# Patient Record
Sex: MALE | Race: ASIAN | NOT HISPANIC OR LATINO | Employment: STUDENT | URBAN - METROPOLITAN AREA
[De-identification: names, ages, dates, MRNs, and addresses within clinical notes are randomized per-mention and may not be internally consistent; named-entity substitution may affect disease eponyms.]

---

## 2023-10-02 ENCOUNTER — TRANSFERRED RECORDS (OUTPATIENT)
Dept: HEALTH INFORMATION MANAGEMENT | Facility: CLINIC | Age: 23
End: 2023-10-02
Payer: COMMERCIAL

## 2023-10-23 ENCOUNTER — MEDICAL CORRESPONDENCE (OUTPATIENT)
Dept: HEALTH INFORMATION MANAGEMENT | Facility: CLINIC | Age: 23
End: 2023-10-23

## 2023-10-24 ENCOUNTER — TRANSCRIBE ORDERS (OUTPATIENT)
Dept: OTHER | Age: 23
End: 2023-10-24

## 2023-10-24 DIAGNOSIS — M67.40 GANGLION CYST: Primary | ICD-10-CM

## 2023-11-02 NOTE — TELEPHONE ENCOUNTER
Action 11/02/23  9:44 AM - [facility]   Action Taken  Called [facility] to double check that there were no images taken-- there were not.       DIAGNOSIS: Ganglion cyst-right hand/ Darline Maria CNP affiliated with [facility] / [facility]/ ortho con    APPOINTMENT DATE: 11/13/23   NOTES STATUS DETAILS   OFFICE NOTE from referring provider Care Everywhere [place]:  - 10/24/23 - Darline Maria APRN CNP

## 2023-11-03 DIAGNOSIS — M25.841 CYST OF JOINT OF HAND, RIGHT: Primary | ICD-10-CM

## 2023-11-13 ENCOUNTER — ANCILLARY PROCEDURE (OUTPATIENT)
Dept: GENERAL RADIOLOGY | Facility: CLINIC | Age: 23
End: 2023-11-13
Attending: STUDENT IN AN ORGANIZED HEALTH CARE EDUCATION/TRAINING PROGRAM
Payer: COMMERCIAL

## 2023-11-13 ENCOUNTER — PRE VISIT (OUTPATIENT)
Dept: ORTHOPEDICS | Facility: CLINIC | Age: 23
End: 2023-11-13

## 2023-11-13 ENCOUNTER — OFFICE VISIT (OUTPATIENT)
Dept: ORTHOPEDICS | Facility: CLINIC | Age: 23
End: 2023-11-13
Payer: COMMERCIAL

## 2023-11-13 DIAGNOSIS — M67.40 GANGLION CYST: ICD-10-CM

## 2023-11-13 DIAGNOSIS — M25.841 CYST OF JOINT OF HAND, RIGHT: ICD-10-CM

## 2023-11-13 PROCEDURE — 73130 X-RAY EXAM OF HAND: CPT | Mod: RT | Performed by: RADIOLOGY

## 2023-11-13 PROCEDURE — 99203 OFFICE O/P NEW LOW 30 MIN: CPT | Performed by: STUDENT IN AN ORGANIZED HEALTH CARE EDUCATION/TRAINING PROGRAM

## 2023-11-13 NOTE — LETTER
11/13/2023         RE: Joshua Steele  [address]   [address]  [place] [address]        Dear Colleague,    Thank you for referring your patient, Joshua Steele, to the [facility] [place]. Please see a copy of my visit note below.    Orthopaedic Surgery Hand and Upper Extremity Clinic H&P Note:  Date: Nov 13, 2023    Patient Name: Joshua Steele  MRN: [id number]    Consult requested by: Darline Maria    CHIEF COMPLAINT: Right ring finger cyst    Dominant Hand: Right  Occupation: Student, statistics      HPI:  Mr. Joshua Steele is a 22 year old male right hand dominant who presents with mass at the volar aspect of right ring MCP joint.  Present for 3 weeks.  Denies any trauma or inciting event.  Intermittently bothersome with  and activities.      PAST MEDICAL HISTORY:  No past medical history on file.    PAST SURGICAL HISTORY:  No past surgical history on file.    MEDICATIONS:  No current outpatient medications on file.     No current facility-administered medications for this visit.       ALLERGIES:   No Known Allergies    FAMILY HISTORY:  No pertinent family history    SOCIAL HISTORY:  Denies tobacco or illicits.  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    The patient's past medical, family, and social history was reviewed and confirmed.    REVIEW OF SYMPTOMS:      General: Negative   Eyes: Negative   Ear, Nose and Throat: Negative   Respiratory: Negative   Cardiovascular: Negative   Gastrointestinal: Negative   Genito-urinary: Negative   Musculoskeletal: Negative  Neurological: Negative   Psychological: Negative  HEME: Negative   ENDO: Negative   SKIN: Negative    VITALS:  There were no vitals filed for this visit.    EXAM:  General: NAD, A&Ox3  HEENT: NC/AT  CV: RRR by peripheral pulse  Pulmonary: Non-labored breathing on RA  RUE:  2 mm nodule at the volar aspect of the MCP joint of the right ring finger.  Nonmobile, nontender  No triggering or crepitus of the left ring  finger with flexion.  Able to make a full composite fist, full flexion at the MCP, PIP, DIP joints  Sensation is intact to light touch median, radial, ulnar nerve distributions  Warm well-perfused, cap refill less than 2 seconds    IMAGING:    X-rays of the right hand today demonstrates no bony abnormality     I have personally reviewed the above images and labs.         IMPRESSION AND RECOMMENDATIONS:  Mr. 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    Discussed the diagnosis, prognosis, and treatment options with the patient.  I discussed that the vast majority of cyst in the hand will resolve spontaneously within the year.    Therefore the patient would like to continue to observe.  If it is still present and bothersome in the next few months, I have asked him to contact me.  We briefly discussed surgical excision.    All questions answered.  The patient voiced understanding and agreement.  Follow-up with me as needed.    Neftaly Valerio MD    Hand, Upper Extremity & Microvascular Surgery  Department of Orthopaedic Surgery  [facility]

## 2023-11-13 NOTE — NURSING NOTE
Reason For Visit:   Chief Complaint   Patient presents with    Consult     Consult for ganglion cyst of right hand, onset about three weeks ago       Primary MD: Darline Maria  Ref. MD: PCP    Age: 22 year old    ?  No      There were no vitals taken for this visit.      Pain Assessment  Patient Currently in Pain: Yes  0-10 Pain Scale: 3  Primary Pain Location: Hand (right)  Pain Descriptors: Aching, Intermittent    Hand Dominance Evaluation  Hand Dominance: Right          QuickDASH Assessment       No data to display                   No current outpatient medications on file.       No Known Allergies    Amelia Dominguez, ATC

## 2023-11-13 NOTE — PROGRESS NOTES
Orthopaedic Surgery Hand and Upper Extremity Clinic H&P Note:  Date: Nov 13, 2023    Patient Name: Joshua Steele  MRN: [id number]    Consult requested by: Darline Maria    CHIEF COMPLAINT: Right ring finger cyst    Dominant Hand: Right  Occupation: Student, statistics      HPI:  Mr. Joshua Steele is a 22 year old male right hand dominant who presents with mass at the volar aspect of right ring MCP joint.  Present for 3 weeks.  Denies any trauma or inciting event.  Intermittently bothersome with  and activities.      PAST MEDICAL HISTORY:  No past medical history on file.    PAST SURGICAL HISTORY:  No past surgical history on file.    MEDICATIONS:  No current outpatient medications on file.     No current facility-administered medications for this visit.       ALLERGIES:   No Known Allergies    FAMILY HISTORY:  No pertinent family history    SOCIAL HISTORY:  Denies tobacco or illicits.  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    The patient's past medical, family, and social history was reviewed and confirmed.    REVIEW OF SYMPTOMS:      General: Negative   Eyes: Negative   Ear, Nose and Throat: Negative   Respiratory: Negative   Cardiovascular: Negative   Gastrointestinal: Negative   Genito-urinary: Negative   Musculoskeletal: Negative  Neurological: Negative   Psychological: Negative  HEME: Negative   ENDO: Negative   SKIN: Negative    VITALS:  There were no vitals filed for this visit.    EXAM:  General: NAD, A&Ox3  HEENT: NC/AT  CV: RRR by peripheral pulse  Pulmonary: Non-labored breathing on RA  RUE:  2 mm nodule at the volar aspect of the MCP joint of the right ring finger.  Nonmobile, nontender  No triggering or crepitus of the left ring finger with flexion.  Able to make a full composite fist, full flexion at the MCP, PIP, DIP joints  Sensation is intact to light touch median, radial, ulnar nerve distributions  Warm well-perfused, cap refill less than 2 seconds    IMAGING:    X-rays of the right hand  today demonstrates no bony abnormality     I have personally reviewed the above images and labs.         IMPRESSION AND RECOMMENDATIONS:  Mr. 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    Discussed the diagnosis, prognosis, and treatment options with the patient.  I discussed that the vast majority of cyst in the hand will resolve spontaneously within the year.    Therefore the patient would like to continue to observe.  If it is still present and bothersome in the next few months, I have asked him to contact me.  We briefly discussed surgical excision.    All questions answered.  The patient voiced understanding and agreement.  Follow-up with me as needed.    Neftaly Valerio MD    Hand, Upper Extremity & Microvascular Surgery  Department of Orthopaedic Surgery  [facility]